# Patient Record
Sex: MALE | Race: ASIAN | NOT HISPANIC OR LATINO
[De-identification: names, ages, dates, MRNs, and addresses within clinical notes are randomized per-mention and may not be internally consistent; named-entity substitution may affect disease eponyms.]

---

## 2017-11-07 ENCOUNTER — FORM ENCOUNTER (OUTPATIENT)
Age: 42
End: 2017-11-07

## 2017-11-20 ENCOUNTER — FORM ENCOUNTER (OUTPATIENT)
Age: 42
End: 2017-11-20

## 2017-12-12 ENCOUNTER — FORM ENCOUNTER (OUTPATIENT)
Age: 42
End: 2017-12-12

## 2017-12-19 ENCOUNTER — FORM ENCOUNTER (OUTPATIENT)
Age: 42
End: 2017-12-19

## 2018-01-08 ENCOUNTER — FORM ENCOUNTER (OUTPATIENT)
Age: 43
End: 2018-01-08

## 2018-01-15 ENCOUNTER — FORM ENCOUNTER (OUTPATIENT)
Age: 43
End: 2018-01-15

## 2018-02-05 ENCOUNTER — FORM ENCOUNTER (OUTPATIENT)
Age: 43
End: 2018-02-05

## 2018-03-18 ENCOUNTER — FORM ENCOUNTER (OUTPATIENT)
Age: 43
End: 2018-03-18

## 2018-04-09 ENCOUNTER — FORM ENCOUNTER (OUTPATIENT)
Age: 43
End: 2018-04-09

## 2018-05-21 ENCOUNTER — FORM ENCOUNTER (OUTPATIENT)
Age: 43
End: 2018-05-21

## 2018-09-03 ENCOUNTER — FORM ENCOUNTER (OUTPATIENT)
Age: 43
End: 2018-09-03

## 2019-06-18 ENCOUNTER — FORM ENCOUNTER (OUTPATIENT)
Age: 44
End: 2019-06-18

## 2019-10-22 ENCOUNTER — FORM ENCOUNTER (OUTPATIENT)
Age: 44
End: 2019-10-22

## 2020-03-31 ENCOUNTER — EMERGENCY (EMERGENCY)
Facility: HOSPITAL | Age: 45
LOS: 1 days | Discharge: HOME | End: 2020-03-31
Admitting: EMERGENCY MEDICINE
Payer: COMMERCIAL

## 2020-03-31 VITALS
TEMPERATURE: 98 F | SYSTOLIC BLOOD PRESSURE: 124 MMHG | OXYGEN SATURATION: 95 % | DIASTOLIC BLOOD PRESSURE: 84 MMHG | RESPIRATION RATE: 24 BRPM | HEART RATE: 81 BPM

## 2020-03-31 PROCEDURE — 99283 EMERGENCY DEPT VISIT LOW MDM: CPT

## 2020-03-31 RX ORDER — ACETAMINOPHEN 500 MG
1 TABLET ORAL
Qty: 30 | Refills: 0
Start: 2020-03-31

## 2020-03-31 NOTE — ED PROVIDER NOTE - NSFOLLOWUPINSTRUCTIONS_ED_ALL_ED_FT
SELF-QUARANTINE PERIOD IS RECOMMENDED TO YOU AS PART OF YOUR CARE:    14 day quarantine is recommended    Stay inside your home as much as possible, avoiding public places or public interaction.    Do not go to work. If you do enter any public domain, at minimum wear a surgical mask at all times.    Even while indoors, attempt to remain isolated from other individuals such as family or friends, as much as possible.    Return to the emergency room for any symptoms such as worsening shortness of breath, significant worsening cough, high fevers despite antipyretics, or severe weakness/malaise.    Take tylenol 1000mg every 6hours for body pain or fevers, fluids, and rest.

## 2020-03-31 NOTE — ED PROVIDER NOTE - PATIENT PORTAL LINK FT
You can access the FollowMyHealth Patient Portal offered by [facility] by registering at the following website: [email or web address]. By joining [facility]’s FollowMyHealth portal, you will also be able to view your health information using other applications (apps) compatible with our system.

## 2020-03-31 NOTE — ED PROVIDER NOTE - OBJECTIVE STATEMENT
45 y/o M denies pmh c/o cough x 3 weeks. Was told to go to the ER by his PMD because pt had viral pneumonia on his CXR.

## 2020-03-31 NOTE — ED ADULT TRIAGE NOTE - CHIEF COMPLAINT QUOTE
Pt c/o cough x 3 weeks, last week took antibx, has worsening cough, Results received today for CXR that showed B/L infiltrates suggestive of viral PNA. Currently denies SOB.

## 2020-04-02 LAB — SARS-COV-2 RNA SPEC QL NAA+PROBE: (no result)

## 2020-05-08 ENCOUNTER — FORM ENCOUNTER (OUTPATIENT)
Age: 45
End: 2020-05-08

## 2020-09-11 ENCOUNTER — FORM ENCOUNTER (OUTPATIENT)
Age: 45
End: 2020-09-11

## 2020-11-01 NOTE — ED PROVIDER NOTE - CPE EDP GASTRO NORM
normal...
Ears: no ear pain and no hearing problems. Nose: no nasal congestion and no nasal drainage. Mouth/Throat: no dysphagia, no hoarseness and no throat pain. Neck: no lumps, no pain, no stiffness and no swollen glands.

## 2022-11-03 NOTE — ED PROVIDER NOTE - CHIEF COMPLAINT
on the discharge service for the patient. I have reviewed and made amendments to the documentation where necessary. The patient is a 44y Male complaining of

## 2022-12-08 DIAGNOSIS — S93.691A OTHER SPRAIN OF RIGHT FOOT, INITIAL ENCOUNTER: ICD-10-CM

## 2022-12-08 DIAGNOSIS — M19.172 POST-TRAUMATIC OSTEOARTHRITIS, LEFT ANKLE AND FOOT: ICD-10-CM

## 2022-12-08 DIAGNOSIS — M19.171 POST-TRAUMATIC OSTEOARTHRITIS, RIGHT ANKLE AND FOOT: ICD-10-CM

## 2022-12-08 DIAGNOSIS — M79.2 NEURALGIA AND NEURITIS, UNSPECIFIED: ICD-10-CM

## 2022-12-08 PROBLEM — Z00.00 ENCOUNTER FOR PREVENTIVE HEALTH EXAMINATION: Status: ACTIVE | Noted: 2022-12-08

## 2022-12-23 ENCOUNTER — APPOINTMENT (OUTPATIENT)
Dept: PODIATRY | Facility: CLINIC | Age: 47
End: 2022-12-23
Payer: COMMERCIAL

## 2022-12-23 DIAGNOSIS — L81.9 DISORDER OF PIGMENTATION, UNSPECIFIED: ICD-10-CM

## 2022-12-23 DIAGNOSIS — M79.672 PAIN IN LEFT FOOT: ICD-10-CM

## 2022-12-23 DIAGNOSIS — R00.0 TACHYCARDIA, UNSPECIFIED: ICD-10-CM

## 2022-12-23 DIAGNOSIS — I10 ESSENTIAL (PRIMARY) HYPERTENSION: ICD-10-CM

## 2022-12-23 DIAGNOSIS — D22.9 MELANOCYTIC NEVI, UNSPECIFIED: ICD-10-CM

## 2022-12-23 PROCEDURE — 11104 PUNCH BX SKIN SINGLE LESION: CPT

## 2022-12-23 PROCEDURE — 99213 OFFICE O/P EST LOW 20 MIN: CPT | Mod: 25

## 2022-12-23 RX ORDER — NEBIVOLOL HYDROCHLORIDE 20 MG/1
TABLET ORAL
Refills: 0 | Status: ACTIVE | COMMUNITY

## 2022-12-30 PROBLEM — M79.672 LEFT FOOT PAIN: Status: ACTIVE | Noted: 2022-12-28

## 2022-12-30 PROBLEM — D22.9 MELANOCYTIC NEVUS OF SKIN: Status: ACTIVE | Noted: 2022-12-28

## 2022-12-30 NOTE — PHYSICAL EXAM
[2+] : left foot dorsalis pedis 2+ [No Joint Swelling] : no joint swelling [] : normal strength/tone [Normal Foot/Ankle] : Both lower extremities were exposed and visualized. Standing exam demonstrates normal foot posture and alignment. Hindfoot exam shows no hindfoot valgus or varus [Sensation] : the sensory exam was normal to light touch and pinprick [No Focal Deficits] : no focal deficits [Deep Tendon Reflexes (DTR)] : deep tendon reflexes were 2+ and symmetric [Motor Exam] : the motor exam was normal [Ankle Swelling (On Exam)] : not present [Varicose Veins Of Lower Extremities] : not present [FreeTextEntry3] : CFT < 3 seconds.  Temperature gradient normal. [FreeTextEntry1] : Left submet. 5 and 3 porokeratosis present with pain on palpation. Right submet. 1 mildly raised striated hyperpigmented lesion with no borders to evaluate.  No ulceration. No bleeding. No clinical signs of infection. It measures about 6mm x 3mm.

## 2022-12-30 NOTE — HISTORY OF PRESENT ILLNESS
[Sneakers] : jacqui [FreeTextEntry1] : Patient presents today with 2 new issues. On the left foot lateral aspect around his 5th metatarsal the patient noticed a painful skin bump that has been present for 2 weeks. He denies any trauma to the area. He denies stepping on anything.  He had tried to trim it off himself however he has been unsuccessful. He denies any redness or drainage or open lesions.  He says he feels significant pain when walking on it. Patient also has right submet. heads 1 hyperpigmented lesion that he states he is not sure how long it has been there and is not sure if it has increased in size. He denies any bleeding from the area or any other similar lesions on his body. He denies any history of cancer.

## 2022-12-30 NOTE — ASSESSMENT
[FreeTextEntry1] : \par Impression: Pain in the left foot. Left porokeratosis. Right hyperpigmented lesion. R/O melanoma.\par \par Treatment:  Discussed etiology and treatment options with the patient for the keratosis. I enucleated the lesion with relief in pain. I advised the patient to wear shoes with adequate padding, exfoliate gently in the shower with a pumice stone and use of over-the-counter moisturizer. For the right foot hyperpigmented lesion, I gave the patient an option to do a skin punch biopsy to further evaluate for any malignancy. Patient consented.\par Location: Right foot submet. Head 1. \par Anesthesia: 1cc of 1% Lidocaine with epi.\par Procedure:\par The skin was prepped with 70% alcohol. Local anesthesia was infiltrated into surrounding skin lesion. A punch biopsy was performed with a 3mm punch to the level of subcutaneous tissue. The wound was left open and dressed with antibiotic cream and a dry clean dressing. I advised the patient to keep the dressing on for the next 1 to 2 days and then remove the dressing,  wash the foot and apply antibiotic cream and a Band-Aid until follow-up appointment. \par I will see him back in 2 to 3 weeks for the results.\par

## 2023-01-28 ENCOUNTER — APPOINTMENT (OUTPATIENT)
Dept: PODIATRY | Facility: CLINIC | Age: 48
End: 2023-01-28
Payer: COMMERCIAL

## 2023-01-28 DIAGNOSIS — Q82.8 OTHER SPECIFIED CONGENITAL MALFORMATIONS OF SKIN: ICD-10-CM

## 2023-01-28 LAB — CORE LAB BIOPSY: NORMAL

## 2023-01-28 PROCEDURE — 99213 OFFICE O/P EST LOW 20 MIN: CPT

## 2023-02-02 PROBLEM — Q82.8 POROKERATOSIS: Status: ACTIVE | Noted: 2022-12-28

## 2023-02-13 NOTE — PHYSICAL EXAM
[2+] : left foot dorsalis pedis 2+ [No Joint Swelling] : no joint swelling [] : normal strength/tone [Normal Foot/Ankle] : Both lower extremities were exposed and visualized. Standing exam demonstrates normal foot posture and alignment. Hindfoot exam shows no hindfoot valgus or varus [Sensation] : the sensory exam was normal to light touch and pinprick [No Focal Deficits] : no focal deficits [Deep Tendon Reflexes (DTR)] : deep tendon reflexes were 2+ and symmetric [Motor Exam] : the motor exam was normal [Ankle Swelling (On Exam)] : not present [Varicose Veins Of Lower Extremities] : not present [FreeTextEntry3] : CFT < 3 seconds.  Temperature gradient normal. [FreeTextEntry1] : Left submet. 5 and 3 porokeratosis present without pain. Right submet. 1 mildly raised striated hyperpigmented lesion with no borders to evaluate.  No ulceration. No bleeding. No clinical signs of infection. It measures about 6mm x 3mm.

## 2023-02-13 NOTE — ASSESSMENT
[FreeTextEntry1] : \par Impression: Left porokeratosis. Right intradermal nevus.\par \par Treatment:  For porokeratosis I advised the patient to continue with his current treatment. He can also use a pumice stone over the hyperkeratotic skin areas. Return to the office if they worsen for possible excision. Regarding the right foot hyperpigmented lesion, I reviewed the biopsy result with the patient which showed intradermal nevus. I informed the patient that it is benign however to check his skin for any signs of any changes to color, shape or borders and return to the office if that occurs. I will see the patient back as needed.

## 2023-02-13 NOTE — HISTORY OF PRESENT ILLNESS
[Sneakers] : jacqui [FreeTextEntry1] : Patient returns today for follow-up of 2 issues. He has left lateral around the 5th metatarsal porokeratosis that has been improved since the last visit. He has bought [facility] Dr[facility] Ahumada's inserts to wear in his steel-toed boots for work and states it feels a lot better. He is also here for follow-up of right submet. 1 hyperpigmented skin lesion S/P biopsy.\par

## 2024-11-21 ENCOUNTER — APPOINTMENT (OUTPATIENT)
Dept: THORACIC SURGERY | Facility: CLINIC | Age: 49
End: 2024-11-21